# Patient Record
Sex: MALE | Race: WHITE
[De-identification: names, ages, dates, MRNs, and addresses within clinical notes are randomized per-mention and may not be internally consistent; named-entity substitution may affect disease eponyms.]

---

## 2021-10-27 ENCOUNTER — TRANSCRIPTION ENCOUNTER (OUTPATIENT)
Age: 56
End: 2021-10-27

## 2021-12-22 ENCOUNTER — TRANSCRIPTION ENCOUNTER (OUTPATIENT)
Age: 56
End: 2021-12-22

## 2022-02-17 ENCOUNTER — OUTPATIENT (OUTPATIENT)
Dept: OUTPATIENT SERVICES | Facility: HOSPITAL | Age: 57
LOS: 1 days | Discharge: HOME | End: 2022-02-17
Payer: SELF-PAY

## 2022-02-17 DIAGNOSIS — Z13.6 ENCOUNTER FOR SCREENING FOR CARDIOVASCULAR DISORDERS: ICD-10-CM

## 2022-02-17 DIAGNOSIS — R07.9 CHEST PAIN, UNSPECIFIED: ICD-10-CM

## 2022-02-17 PROCEDURE — 75571 CT HRT W/O DYE W/CA TEST: CPT | Mod: 26

## 2022-02-17 PROCEDURE — 71250 CT THORAX DX C-: CPT | Mod: 26

## 2022-03-02 PROBLEM — Z00.00 ENCOUNTER FOR PREVENTIVE HEALTH EXAMINATION: Status: ACTIVE | Noted: 2022-03-02

## 2022-03-08 PROBLEM — E78.5 HLD (HYPERLIPIDEMIA): Status: ACTIVE | Noted: 2022-03-08

## 2022-03-08 PROBLEM — I25.10 CAD (CORONARY ARTERY DISEASE): Status: ACTIVE | Noted: 2022-03-08

## 2022-03-08 PROBLEM — I10 HTN (HYPERTENSION): Status: ACTIVE | Noted: 2022-03-08

## 2022-03-09 ENCOUNTER — APPOINTMENT (OUTPATIENT)
Dept: CARDIOLOGY | Facility: CLINIC | Age: 57
End: 2022-03-09
Payer: MEDICARE

## 2022-03-09 VITALS — DIASTOLIC BLOOD PRESSURE: 80 MMHG | HEART RATE: 55 BPM | SYSTOLIC BLOOD PRESSURE: 130 MMHG

## 2022-03-09 VITALS — HEIGHT: 73 IN | WEIGHT: 261 LBS | BODY MASS INDEX: 34.59 KG/M2 | TEMPERATURE: 97.6 F

## 2022-03-09 DIAGNOSIS — G89.29 DORSALGIA, UNSPECIFIED: ICD-10-CM

## 2022-03-09 DIAGNOSIS — M54.9 DORSALGIA, UNSPECIFIED: ICD-10-CM

## 2022-03-09 DIAGNOSIS — E78.5 HYPERLIPIDEMIA, UNSPECIFIED: ICD-10-CM

## 2022-03-09 DIAGNOSIS — I10 ESSENTIAL (PRIMARY) HYPERTENSION: ICD-10-CM

## 2022-03-09 DIAGNOSIS — Z78.9 OTHER SPECIFIED HEALTH STATUS: ICD-10-CM

## 2022-03-09 DIAGNOSIS — I25.10 ATHEROSCLEROTIC HEART DISEASE OF NATIVE CORONARY ARTERY W/OUT ANGINA PECTORIS: ICD-10-CM

## 2022-03-09 PROCEDURE — 93000 ELECTROCARDIOGRAM COMPLETE: CPT

## 2022-03-09 PROCEDURE — 99202 OFFICE O/P NEW SF 15 MIN: CPT

## 2022-03-09 RX ORDER — ASPIRIN 81 MG/1
81 TABLET, CHEWABLE ORAL
Qty: 90 | Refills: 3 | Status: ACTIVE | COMMUNITY
Start: 2022-03-09 | End: 1900-01-01

## 2022-03-09 RX ORDER — ATENOLOL 50 MG/1
50 TABLET ORAL
Refills: 0 | Status: ACTIVE | COMMUNITY

## 2022-03-09 RX ORDER — PANTOPRAZOLE 40 MG/1
40 TABLET, DELAYED RELEASE ORAL
Refills: 0 | Status: ACTIVE | COMMUNITY

## 2022-03-09 RX ORDER — FENOFIBRATE 48 MG/1
48 TABLET ORAL DAILY
Qty: 90 | Refills: 3 | Status: ACTIVE | COMMUNITY
Start: 2022-03-09 | End: 1900-01-01

## 2022-03-09 RX ORDER — ATORVASTATIN CALCIUM 40 MG/1
40 TABLET, FILM COATED ORAL
Refills: 0 | Status: ACTIVE | COMMUNITY
Start: 2022-03-09

## 2022-03-09 RX ORDER — SERTRALINE HYDROCHLORIDE 50 MG/1
50 TABLET, FILM COATED ORAL
Refills: 0 | Status: ACTIVE | COMMUNITY

## 2022-03-09 NOTE — HISTORY OF PRESENT ILLNESS
[FreeTextEntry1] : Mr. Carrion is a 57yo M with PMHx of CAD, HTN, HLD who presents to establish care. His PMD is Dr. Judith Lee. HIs recent CT CAC showed score of 193 in LAD and 24 in Lcx. Patient states that he had hyperlipidemia since the age of 16, on statin therapy. Patient does complain of dizziness when he is tired. He denies chest pain, SOB. He has chronic back pain, multiple back and knee surgeries in the past. He is being evaluated by his PCP for pre diabetes.

## 2022-03-09 NOTE — ASSESSMENT
[FreeTextEntry1] : 56 y.o. male with PMH of abnormal CT CAC c/w CAD, hyperlipidemia and triglyceridemia, HTN, chronic back pain, multiple back surgeries presents to establish care. Patient complains of feeling dizzy when he is tired, denies chest pain, palpitations, SOB, episodes of syncope. \par \par CAD/dizziness\par - given Hx of chronic back pain and multiple back and knee surgeries, will obtain CT Angio with coronaries with IV contrast to r/o ischemia \par - will need CMP prior to CTA, last blood test was done 02/14/2022, Cr 1.2\par - To assess LV/RV function will obtain TTE\par \par Hyperlipidemia/triglyceridemia\par - continue Atorvastatin 40 mg PO daily\par - add fenofibrate 48 mg PO daily\par - will order lipid profile at the next appointment\par - risk factors modification: low cholesterol diet, regular exercise\par - will obtain blood work for pre diabetes evaluation from PCP\par \par F/U in 3 months

## 2022-03-09 NOTE — REASON FOR VISIT
[Other: ____] : [unfilled] [FreeTextEntry1] : Diagnostic Tests:\par --------------------\par EKG:\par 03/09/22- Sinus bradycardia\par --------------------\par CT:\par 02/17/22-CT CAC- Total 217. LM 0, , LCx 24, RCA 0. 84th percentile.

## 2022-04-07 ENCOUNTER — APPOINTMENT (OUTPATIENT)
Dept: CARDIOLOGY | Facility: CLINIC | Age: 57
End: 2022-04-07
Payer: MEDICARE

## 2022-04-07 PROCEDURE — 93306 TTE W/DOPPLER COMPLETE: CPT

## 2022-06-06 NOTE — REASON FOR VISIT
[Other: ____] : [unfilled] [FreeTextEntry1] : Diagnostic Tests:\par --------------------\par EKG:\par 03/09/22- Sinus bradycardia\par --------------------\par CT:\par 02/17/22-CT CAC- Total 217. LM 0, , LCx 24, RCA 0. 84th percentile. \par \par CTA \par 05/17/2022 - , mild stenosis 20-30% of pLAD, mLAD, dLAD. Mild stenosis 20-30% of pLCx, dLCx. 3 small nodules in RUL and RML - 12 months follow up is recommended. LVEF 63%\par \par TTE\par 04/07/2022 - LVEF 66%, normal LV function\par

## 2022-06-06 NOTE — HISTORY OF PRESENT ILLNESS
[FreeTextEntry1] : Mr. Carrion is a 55yo M with PMHx of CAD, HTN, HLD who presents to establish care. His PMD is Dr. Judith Lee. HIs recent CT CAC showed score of 193 in LAD and 24 in Lcx. Patient states that he had hyperlipidemia since the age of 16, on statin therapy. Patient does complain of dizziness when he is tired. He denies chest pain, SOB. He has chronic back pain, multiple back and knee surgeries in the past. He is being evaluated by his PCP for pre diabetes.

## 2022-06-08 ENCOUNTER — APPOINTMENT (OUTPATIENT)
Dept: CARDIOLOGY | Facility: CLINIC | Age: 57
End: 2022-06-08

## 2022-06-08 ENCOUNTER — RESULT CHARGE (OUTPATIENT)
Age: 57
End: 2022-06-08